# Patient Record
(demographics unavailable — no encounter records)

---

## 2025-03-31 NOTE — PHYSICAL EXAM

## 2025-03-31 NOTE — DEVELOPMENTAL MILESTONES
[Is beginning to skip] : is beginning to skip [Spreads with a knife] : does not spread with a knife [Dresses and undresses without help] : does not dress and undress without help [Goes to the bathroom independently] : does not go to bathroom independently [Is dry through the day] :  is not dry through the day [Plays and interacts with peer] : does not play and interacts with peer [Answers "why" questions] : does not answer "why" questions [Tells a story of 2 sentences or more] : does not tell a story of 2 sentences or more [Follows directions for 4 individual] : does not follow directions for 4 individual prepositions [Counts 5 objects] : does not count 5 objects [Names 3 or more numbers] : does not name 3 or more numbers [Names 4 or more letters out of order] : does not name 4 or more letters out of order [FreeTextEntry1] : gets speech ,DEJAN AND OT

## 2025-03-31 NOTE — HISTORY OF PRESENT ILLNESS
[Fruit] : fruit [Vegetables] : vegetables [Meat] : meat [Grains] : grains [Eggs] : eggs [Fish] : fish [Dairy] : dairy [Normal] : Normal [No] : No cigarette smoke exposure [Water heater temperature set at <120 degrees F] : Water heater temperature set at <120 degrees F [Car seat in back seat] : Car seat in back seat [Carbon Monoxide Detectors] : Carbon monoxide detectors [Smoke Detectors] : Smoke detectors [Supervised outdoor play] : Supervised outdoor play [Up to date] : Up to date [NO] : No [de-identified] : [facility]

## 2025-03-31 NOTE — DISCUSSION/SUMMARY
[] : The components of the vaccine(s) to be administered today are listed in the plan of care. The disease(s) for which the vaccine(s) are intended to prevent and the risks have been discussed with the caretaker.  The risks are also included in the appropriate vaccination information statements which have been provided to the patient's caregiver.  The caregiver has given consent to vaccinate. [FreeTextEntry1] : CONTINUE SPEECH .OT AND DEJAN PT IS VERY HYPERACTIVE SEES A NEUROLOGIST  NO MEDS GIVEN TO SEE DEVELOPMENTAL PED